# Patient Record
Sex: FEMALE | Race: WHITE | NOT HISPANIC OR LATINO
[De-identification: names, ages, dates, MRNs, and addresses within clinical notes are randomized per-mention and may not be internally consistent; named-entity substitution may affect disease eponyms.]

---

## 2018-10-26 ENCOUNTER — NON-APPOINTMENT (OUTPATIENT)
Age: 73
End: 2018-10-26

## 2018-10-26 ENCOUNTER — APPOINTMENT (OUTPATIENT)
Dept: CARDIOLOGY | Facility: CLINIC | Age: 73
End: 2018-10-26
Payer: MEDICARE

## 2018-10-26 VITALS
BODY MASS INDEX: 20.02 KG/M2 | HEART RATE: 76 BPM | HEIGHT: 63 IN | OXYGEN SATURATION: 98 % | WEIGHT: 113 LBS | SYSTOLIC BLOOD PRESSURE: 100 MMHG | DIASTOLIC BLOOD PRESSURE: 68 MMHG

## 2018-10-26 DIAGNOSIS — R91.1 SOLITARY PULMONARY NODULE: ICD-10-CM

## 2018-10-26 PROCEDURE — 93000 ELECTROCARDIOGRAM COMPLETE: CPT

## 2018-10-26 PROCEDURE — 99214 OFFICE O/P EST MOD 30 MIN: CPT

## 2018-12-03 ENCOUNTER — APPOINTMENT (OUTPATIENT)
Dept: CARDIOLOGY | Facility: CLINIC | Age: 73
End: 2018-12-03

## 2018-12-10 ENCOUNTER — APPOINTMENT (OUTPATIENT)
Dept: CARDIOLOGY | Facility: CLINIC | Age: 73
End: 2018-12-10

## 2019-10-21 ENCOUNTER — APPOINTMENT (OUTPATIENT)
Dept: CARDIOLOGY | Facility: CLINIC | Age: 74
End: 2019-10-21
Payer: MEDICARE

## 2019-10-21 PROCEDURE — 78452 HT MUSCLE IMAGE SPECT MULT: CPT

## 2019-10-21 PROCEDURE — A9502: CPT

## 2019-10-21 PROCEDURE — 93306 TTE W/DOPPLER COMPLETE: CPT

## 2019-10-21 PROCEDURE — 93015 CV STRESS TEST SUPVJ I&R: CPT

## 2019-11-01 ENCOUNTER — APPOINTMENT (OUTPATIENT)
Dept: CARDIOLOGY | Facility: CLINIC | Age: 74
End: 2019-11-01
Payer: MEDICARE

## 2019-11-01 VITALS
WEIGHT: 111 LBS | HEIGHT: 63 IN | SYSTOLIC BLOOD PRESSURE: 102 MMHG | DIASTOLIC BLOOD PRESSURE: 68 MMHG | BODY MASS INDEX: 19.67 KG/M2 | HEART RATE: 70 BPM | OXYGEN SATURATION: 96 %

## 2019-11-01 DIAGNOSIS — Z72.3 LACK OF PHYSICAL EXERCISE: ICD-10-CM

## 2019-11-01 DIAGNOSIS — H53.9 UNSPECIFIED VISUAL DISTURBANCE: ICD-10-CM

## 2019-11-01 DIAGNOSIS — Z78.9 OTHER SPECIFIED HEALTH STATUS: ICD-10-CM

## 2019-11-01 DIAGNOSIS — I31.3 PERICARDIAL EFFUSION (NONINFLAMMATORY): ICD-10-CM

## 2019-11-01 PROCEDURE — 99214 OFFICE O/P EST MOD 30 MIN: CPT

## 2019-11-01 NOTE — ASSESSMENT
[FreeTextEntry1] : Visual disturbances: The patient has had some episodes of wavy lines in her vision. 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 Bilateral carotid duplex has been arranged to rule out peripheral vascular disease.\par \par Shortness of breath: The patient is some minimal dyspnea at times. At other times she is able to exercise on treadmill for 15-30 minutes without exertional symptoms. Overall continued observation is her best option.\par \par Pericardial effusion: Essentially no change from prior.\par Observation.

## 2019-11-26 ENCOUNTER — APPOINTMENT (OUTPATIENT)
Dept: CARDIOLOGY | Facility: CLINIC | Age: 74
End: 2019-11-26

## 2019-11-26 DIAGNOSIS — Z86.39 PERSONAL HISTORY OF OTHER ENDOCRINE, NUTRITIONAL AND METABOLIC DISEASE: ICD-10-CM

## 2020-11-13 ENCOUNTER — APPOINTMENT (OUTPATIENT)
Dept: CARDIOLOGY | Facility: CLINIC | Age: 75
End: 2020-11-13

## 2021-02-22 ENCOUNTER — NON-APPOINTMENT (OUTPATIENT)
Age: 76
End: 2021-02-22

## 2021-02-22 PROBLEM — Z78.9 DOES NOT USE ILLICIT DRUGS: Status: ACTIVE | Noted: 2021-02-22

## 2021-02-22 PROBLEM — Z86.39 HISTORY OF HYPOTHYROIDISM: Status: RESOLVED | Noted: 2021-02-22 | Resolved: 2021-02-22

## 2021-02-22 PROBLEM — Z78.9 CAFFEINE USE: Status: ACTIVE | Noted: 2021-02-22

## 2021-02-22 PROBLEM — Z72.3 DOES NOT EXERCISE: Status: ACTIVE | Noted: 2021-02-22

## 2021-02-22 PROBLEM — Z78.9 NON-SMOKER: Status: ACTIVE | Noted: 2021-02-22

## 2021-02-22 RX ORDER — LEVOTHYROXINE SODIUM 75 UG/1
75 TABLET ORAL DAILY
Refills: 0 | Status: ACTIVE | COMMUNITY

## 2021-02-23 ENCOUNTER — NON-APPOINTMENT (OUTPATIENT)
Age: 76
End: 2021-02-23

## 2021-02-23 ENCOUNTER — APPOINTMENT (OUTPATIENT)
Dept: CARDIOLOGY | Facility: CLINIC | Age: 76
End: 2021-02-23
Payer: MEDICARE

## 2021-02-23 VITALS
WEIGHT: 110 LBS | TEMPERATURE: 98.9 F | OXYGEN SATURATION: 96 % | HEART RATE: 86 BPM | BODY MASS INDEX: 19.49 KG/M2 | SYSTOLIC BLOOD PRESSURE: 110 MMHG | HEIGHT: 63 IN | DIASTOLIC BLOOD PRESSURE: 78 MMHG

## 2021-02-23 DIAGNOSIS — R00.2 PALPITATIONS: ICD-10-CM

## 2021-02-23 DIAGNOSIS — R06.00 DYSPNEA, UNSPECIFIED: ICD-10-CM

## 2021-02-23 PROCEDURE — 93000 ELECTROCARDIOGRAM COMPLETE: CPT

## 2021-02-23 PROCEDURE — 99213 OFFICE O/P EST LOW 20 MIN: CPT

## 2021-02-23 PROCEDURE — 93242 EXT ECG>48HR<7D RECORDING: CPT

## 2021-02-23 RX ORDER — CHROMIUM 200 MCG
TABLET ORAL
Refills: 0 | Status: ACTIVE | COMMUNITY

## 2021-02-23 RX ORDER — MULTIVITAMIN
TABLET ORAL
Refills: 0 | Status: ACTIVE | COMMUNITY

## 2021-02-23 NOTE — HISTORY OF PRESENT ILLNESS
[FreeTextEntry1] : The patient has a chronic pericardial effusion.  The patient was noted by her son to be somewhat tachypneic after going up and down stairs.  The patient wears a K N95 mask.  She is wearing a mask at the time that the son felt she was tachypneic.  In addition the patient has been having palpitations nearly every day for several weeks.  They can last up to hours at a time.

## 2021-02-23 NOTE — ASSESSMENT
[FreeTextEntry1] : Shortness of breath: Transthoracic echocardiography is indicated to assess her pericardial effusion.\par \par Palpitations: 7-day event monitoring has been arranged.

## 2021-03-05 ENCOUNTER — APPOINTMENT (OUTPATIENT)
Dept: CARDIOLOGY | Facility: CLINIC | Age: 76
End: 2021-03-05
Payer: MEDICARE

## 2021-03-05 PROCEDURE — 93306 TTE W/DOPPLER COMPLETE: CPT

## 2021-03-11 ENCOUNTER — APPOINTMENT (OUTPATIENT)
Dept: CARDIOLOGY | Facility: CLINIC | Age: 76
End: 2021-03-11

## 2021-03-27 ENCOUNTER — TRANSCRIPTION ENCOUNTER (OUTPATIENT)
Age: 76
End: 2021-03-27

## 2022-10-20 ENCOUNTER — NON-APPOINTMENT (OUTPATIENT)
Age: 77
End: 2022-10-20

## 2022-11-14 ENCOUNTER — APPOINTMENT (OUTPATIENT)
Dept: CARDIOLOGY | Facility: CLINIC | Age: 77
End: 2022-11-14

## 2022-11-14 PROCEDURE — 93306 TTE W/DOPPLER COMPLETE: CPT

## 2022-11-16 ENCOUNTER — NON-APPOINTMENT (OUTPATIENT)
Age: 77
End: 2022-11-16

## 2022-11-22 ENCOUNTER — APPOINTMENT (OUTPATIENT)
Dept: CARDIOLOGY | Facility: CLINIC | Age: 77
End: 2022-11-22

## 2023-01-09 ENCOUNTER — OFFICE (OUTPATIENT)
Dept: URBAN - METROPOLITAN AREA CLINIC 100 | Facility: CLINIC | Age: 78
Setting detail: OPHTHALMOLOGY
End: 2023-01-09
Payer: MEDICARE

## 2023-01-09 DIAGNOSIS — H02.831: ICD-10-CM

## 2023-01-09 DIAGNOSIS — H25.11: ICD-10-CM

## 2023-01-09 DIAGNOSIS — H02.834: ICD-10-CM

## 2023-01-09 DIAGNOSIS — H40.1111: ICD-10-CM

## 2023-01-09 DIAGNOSIS — H02.403: ICD-10-CM

## 2023-01-09 DIAGNOSIS — Z96.1: ICD-10-CM

## 2023-01-09 DIAGNOSIS — H25.89: ICD-10-CM

## 2023-01-09 PROCEDURE — 92083 EXTENDED VISUAL FIELD XM: CPT | Performed by: OPHTHALMOLOGY

## 2023-01-09 PROCEDURE — 99214 OFFICE O/P EST MOD 30 MIN: CPT | Performed by: OPHTHALMOLOGY

## 2023-01-09 PROCEDURE — 92133 CPTRZD OPH DX IMG PST SGM ON: CPT | Performed by: OPHTHALMOLOGY

## 2023-01-09 ASSESSMENT — KERATOMETRY
METHOD_AUTO_MANUAL: AUTO
OS_K2POWER_DIOPTERS: 42.75
OD_K1POWER_DIOPTERS: 42.00
OS_AXISANGLE_DEGREES: 067
OS_K1POWER_DIOPTERS: 41.50
OD_AXISANGLE_DEGREES: 084
OD_K2POWER_DIOPTERS: 42.25

## 2023-01-09 ASSESSMENT — REFRACTION_MANIFEST
OD_CYLINDER: -1.00
OD_CYLINDER: -1.00
OS_SPHERE: PLANO
OD_AXIS: 080
OD_SPHERE: +1.25
OS_SPHERE: PLANO
OD_ADD: +2.75
OD_VA1: 20/25-2
OS_AXIS: 055
OS_AXIS: 055
OS_CYLINDER: -0.75
OS_VA1: 20/25-2
OD_SPHERE: +1.25
OD_AXIS: 080
OS_ADD: +2.75
OS_VA1: 20/25-2
OS_CYLINDER: -0.75
OD_VA1: 20/25-2

## 2023-01-09 ASSESSMENT — REFRACTION_CURRENTRX
OS_CYLINDER: -0.75
OD_SPHERE: +1.25
OD_AXIS: 81
OD_VPRISM_DIRECTION: SV
OS_AXIS: 54
OS_OVR_VA: 20/
OD_OVR_VA: 20/
OS_VPRISM_DIRECTION: SV
OS_SPHERE: PLANO
OD_CYLINDER: -1.00

## 2023-01-09 ASSESSMENT — VISUAL ACUITY
OD_BCVA: 20/50-2
OS_BCVA: 20/40-2

## 2023-01-09 ASSESSMENT — REFRACTION_AUTOREFRACTION
OD_AXIS: 078
OD_CYLINDER: -1.75
OS_CYLINDER: -1.50
OD_SPHERE: +2.50
OS_AXIS: 065
OS_SPHERE: +1.00

## 2023-01-09 ASSESSMENT — LID POSITION - DERMATOCHALASIS
OD_DERMATOCHALASIS: RUL 1+
OS_DERMATOCHALASIS: LUL 1+

## 2023-01-09 ASSESSMENT — SPHEQUIV_DERIVED
OD_SPHEQUIV: 0.75
OD_SPHEQUIV: 0.75
OS_SPHEQUIV: 0.25
OD_SPHEQUIV: 1.625

## 2023-01-09 ASSESSMENT — PACHYMETRY
OD_CT_CORRECTION: -1
OS_CT_CORRECTION: -1
OS_CT_UM: 558
OD_CT_UM: 566

## 2023-01-09 ASSESSMENT — AXIALLENGTH_DERIVED
OD_AL: 23.8067
OD_AL: 23.8067
OS_AL: 24.0067
OD_AL: 23.4645

## 2023-01-09 ASSESSMENT — LID POSITION - PTOSIS
OS_PTOSIS: LUL T
OD_PTOSIS: RUL T

## 2023-01-09 ASSESSMENT — TONOMETRY
OS_IOP_MMHG: 14
OD_IOP_MMHG: 14

## 2023-01-09 ASSESSMENT — CONFRONTATIONAL VISUAL FIELD TEST (CVF)
OD_FINDINGS: FULL
OS_FINDINGS: FULL

## 2023-05-08 ENCOUNTER — OFFICE (OUTPATIENT)
Dept: URBAN - METROPOLITAN AREA CLINIC 100 | Facility: CLINIC | Age: 78
Setting detail: OPHTHALMOLOGY
End: 2023-05-08
Payer: MEDICARE

## 2023-05-08 DIAGNOSIS — H02.831: ICD-10-CM

## 2023-05-08 DIAGNOSIS — H02.403: ICD-10-CM

## 2023-05-08 DIAGNOSIS — Z96.1: ICD-10-CM

## 2023-05-08 DIAGNOSIS — H25.11: ICD-10-CM

## 2023-05-08 DIAGNOSIS — H02.834: ICD-10-CM

## 2023-05-08 DIAGNOSIS — H25.89: ICD-10-CM

## 2023-05-08 DIAGNOSIS — H40.1111: ICD-10-CM

## 2023-05-08 PROCEDURE — 99214 OFFICE O/P EST MOD 30 MIN: CPT | Performed by: OPHTHALMOLOGY

## 2023-05-08 ASSESSMENT — PACHYMETRY
OS_CT_CORRECTION: -1
OD_CT_UM: 566
OD_CT_CORRECTION: -1
OS_CT_UM: 558

## 2023-05-08 ASSESSMENT — REFRACTION_CURRENTRX
OS_AXIS: 055
OD_OVR_VA: 20/
OS_OVR_VA: 20/
OS_SPHERE: +2.75
OS_VPRISM_DIRECTION: SV
OS_SPHERE: PLANO
OD_VPRISM_DIRECTION: SV
OD_SPHERE: +1.25
OD_AXIS: 080
OD_OVR_VA: 20/
OD_AXIS: 090
OD_CYLINDER: -1.00
OS_OVR_VA: 20/
OS_VPRISM_DIRECTION: SV
OS_CYLINDER: -0.75
OD_CYLINDER: -1.00
OD_SPHERE: +4.00
OS_CYLINDER: -0.75
OS_AXIS: 050
OD_VPRISM_DIRECTION: SV

## 2023-05-08 ASSESSMENT — LID POSITION - PTOSIS
OD_PTOSIS: RUL T
OS_PTOSIS: LUL T

## 2023-05-08 ASSESSMENT — REFRACTION_MANIFEST
OD_AXIS: 080
OD_CYLINDER: -1.00
OS_CYLINDER: -0.75
OS_VA1: 20/25-2
OS_SPHERE: PLANO
OD_VA1: 20/25-2
OD_AXIS: 080
OD_ADD: +2.75
OS_ADD: +2.75
OD_SPHERE: +1.25
OS_VA1: 20/25-2
OS_SPHERE: PLANO
OD_CYLINDER: -1.00
OD_SPHERE: +1.25
OS_CYLINDER: -0.75
OS_AXIS: 055
OD_VA1: 20/25-2
OS_AXIS: 055

## 2023-05-08 ASSESSMENT — VISUAL ACUITY
OS_BCVA: 20/30-1
OD_BCVA: 20/25

## 2023-05-08 ASSESSMENT — REFRACTION_AUTOREFRACTION
OS_SPHERE: PLANO
OD_SPHERE: +3.00
OD_CYLINDER: -2.00
OS_CYLINDER: -0.50
OS_AXIS: 074
OD_AXIS: 083

## 2023-05-08 ASSESSMENT — KERATOMETRY
OS_AXISANGLE_DEGREES: 075
OD_K2POWER_DIOPTERS: 42.25
OS_K2POWER_DIOPTERS: 42.75
OS_K1POWER_DIOPTERS: 42.00
OD_K1POWER_DIOPTERS: 41.75
METHOD_AUTO_MANUAL: AUTO
OD_AXISANGLE_DEGREES: 098

## 2023-05-08 ASSESSMENT — AXIALLENGTH_DERIVED
OD_AL: 23.8535
OD_AL: 23.8535
OD_AL: 23.3658

## 2023-05-08 ASSESSMENT — SPHEQUIV_DERIVED
OD_SPHEQUIV: 2
OD_SPHEQUIV: 0.75
OD_SPHEQUIV: 0.75

## 2023-05-08 ASSESSMENT — TONOMETRY
OD_IOP_MMHG: 16
OS_IOP_MMHG: 15

## 2023-05-08 ASSESSMENT — LID POSITION - DERMATOCHALASIS
OS_DERMATOCHALASIS: LUL 1+
OD_DERMATOCHALASIS: RUL 1+

## 2023-09-11 ENCOUNTER — OFFICE (OUTPATIENT)
Dept: URBAN - METROPOLITAN AREA CLINIC 100 | Facility: CLINIC | Age: 78
Setting detail: OPHTHALMOLOGY
End: 2023-09-11
Payer: MEDICARE

## 2023-09-11 DIAGNOSIS — H25.89: ICD-10-CM

## 2023-09-11 DIAGNOSIS — H02.834: ICD-10-CM

## 2023-09-11 DIAGNOSIS — H02.403: ICD-10-CM

## 2023-09-11 DIAGNOSIS — H40.1111: ICD-10-CM

## 2023-09-11 DIAGNOSIS — Z96.1: ICD-10-CM

## 2023-09-11 DIAGNOSIS — H25.11: ICD-10-CM

## 2023-09-11 DIAGNOSIS — H02.831: ICD-10-CM

## 2023-09-11 PROCEDURE — 92250 FUNDUS PHOTOGRAPHY W/I&R: CPT | Performed by: OPHTHALMOLOGY

## 2023-09-11 PROCEDURE — 92014 COMPRE OPH EXAM EST PT 1/>: CPT | Performed by: OPHTHALMOLOGY

## 2023-09-11 ASSESSMENT — REFRACTION_MANIFEST
OS_ADD: +2.75
OS_VA1: 20/25
OD_SPHERE: +1.25
OS_SPHERE: PLANO
OS_CYLINDER: -1.00
OS_VA1: 20/25
OS_AXIS: 070
OS_CYLINDER: -1.00
OD_CYLINDER: -1.50
OD_AXIS: 055
OD_ADD: +2.75
OD_VA1: 20/20-2
OD_AXIS: 055
OD_VA1: 20/20-2
OS_AXIS: 070
OS_SPHERE: PLANO
OD_SPHERE: +1.25
OD_CYLINDER: -1.50

## 2023-09-11 ASSESSMENT — TONOMETRY
OD_IOP_MMHG: 16
OS_IOP_MMHG: 17

## 2023-09-11 ASSESSMENT — KERATOMETRY
OS_K1POWER_DIOPTERS: 42.50
OD_K2POWER_DIOPTERS: 43.25
OD_K1POWER_DIOPTERS: 42.50
OD_AXISANGLE_DEGREES: 140
OS_AXISANGLE_DEGREES: 166
OS_K2POWER_DIOPTERS: 43.00
METHOD_AUTO_MANUAL: AUTO

## 2023-09-11 ASSESSMENT — AXIALLENGTH_DERIVED
OD_AL: 23.4329
OD_AL: 23.6267
OD_AL: 23.6267
OS_AL: 23.8706

## 2023-09-11 ASSESSMENT — REFRACTION_CURRENTRX
OD_VPRISM_DIRECTION: SV
OD_VPRISM_DIRECTION: SV
OD_CYLINDER: -1.00
OS_CYLINDER: -0.75
OS_OVR_VA: 20/
OS_AXIS: 055
OD_OVR_VA: 20/
OS_OVR_VA: 20/
OS_SPHERE: PLANO
OS_AXIS: 050
OD_SPHERE: +1.25
OD_AXIS: 080
OD_AXIS: 090
OD_CYLINDER: -1.00
OS_SPHERE: +2.75
OD_OVR_VA: 20/
OS_CYLINDER: -0.75
OS_VPRISM_DIRECTION: SV
OS_VPRISM_DIRECTION: SV
OD_SPHERE: +4.00

## 2023-09-11 ASSESSMENT — REFRACTION_AUTOREFRACTION
OD_SPHERE: +1.75
OD_CYLINDER: -1.50
OS_AXIS: 069
OS_SPHERE: +0.50
OD_AXIS: 056
OS_CYLINDER: -1.00

## 2023-09-11 ASSESSMENT — SPHEQUIV_DERIVED
OD_SPHEQUIV: 1
OS_SPHEQUIV: 0
OD_SPHEQUIV: 0.5
OD_SPHEQUIV: 0.5

## 2023-09-11 ASSESSMENT — LID POSITION - DERMATOCHALASIS
OD_DERMATOCHALASIS: RUL 1+
OS_DERMATOCHALASIS: LUL 1+

## 2023-09-11 ASSESSMENT — VISUAL ACUITY
OS_BCVA: 20/30-2
OD_BCVA: 20/40

## 2023-09-11 ASSESSMENT — PACHYMETRY
OS_CT_UM: 558
OD_CT_CORRECTION: -1
OD_CT_UM: 566
OS_CT_CORRECTION: -1

## 2023-09-11 ASSESSMENT — LID POSITION - PTOSIS
OD_PTOSIS: RUL T
OS_PTOSIS: LUL T

## 2023-09-11 ASSESSMENT — CONFRONTATIONAL VISUAL FIELD TEST (CVF)
OS_FINDINGS: FULL
OD_FINDINGS: FULL

## 2024-01-18 ENCOUNTER — OFFICE (OUTPATIENT)
Dept: URBAN - METROPOLITAN AREA CLINIC 100 | Facility: CLINIC | Age: 79
Setting detail: OPHTHALMOLOGY
End: 2024-01-18
Payer: MEDICARE

## 2024-01-18 DIAGNOSIS — Z96.1: ICD-10-CM

## 2024-01-18 DIAGNOSIS — H02.834: ICD-10-CM

## 2024-01-18 DIAGNOSIS — H25.89: ICD-10-CM

## 2024-01-18 DIAGNOSIS — H25.11: ICD-10-CM

## 2024-01-18 DIAGNOSIS — H40.1111: ICD-10-CM

## 2024-01-18 DIAGNOSIS — H02.831: ICD-10-CM

## 2024-01-18 DIAGNOSIS — H02.403: ICD-10-CM

## 2024-01-18 PROCEDURE — 99214 OFFICE O/P EST MOD 30 MIN: CPT | Performed by: OPHTHALMOLOGY

## 2024-01-18 PROCEDURE — 92133 CPTRZD OPH DX IMG PST SGM ON: CPT | Performed by: OPHTHALMOLOGY

## 2024-01-18 PROCEDURE — 92083 EXTENDED VISUAL FIELD XM: CPT | Performed by: OPHTHALMOLOGY

## 2024-01-18 ASSESSMENT — REFRACTION_AUTOREFRACTION
OD_AXIS: 077
OS_CYLINDER: -1.00
OD_SPHERE: +2.50
OS_AXIS: 062
OD_CYLINDER: -1.75
OS_SPHERE: +0.25

## 2024-01-18 ASSESSMENT — REFRACTION_CURRENTRX
OD_OVR_VA: 20/
OS_VPRISM_DIRECTION: SV
OD_VPRISM_DIRECTION: SV
OS_OVR_VA: 20/
OS_OVR_VA: 20/
OD_SPHERE: +1.25
OD_OVR_VA: 20/
OS_CYLINDER: -0.75
OD_AXIS: 090
OD_AXIS: 055
OS_VPRISM_DIRECTION: SV
OD_SPHERE: +4.00
OS_AXIS: 050
OS_SPHERE: +2.75
OS_SPHERE: PLANO
OS_AXIS: 064
OD_CYLINDER: -1.50
OD_CYLINDER: -1.00
OD_VPRISM_DIRECTION: SV
OS_CYLINDER: -1.00

## 2024-01-18 ASSESSMENT — REFRACTION_MANIFEST
OD_ADD: +2.75
OS_SPHERE: PLANO
OS_AXIS: 070
OS_ADD: +2.75
OS_VA1: 20/25
OD_AXIS: 055
OS_CYLINDER: -1.00
OS_CYLINDER: -1.00
OD_SPHERE: +1.25
OS_VA1: 20/25
OD_VA1: 20/20-2
OD_SPHERE: +1.25
OS_AXIS: 070
OD_CYLINDER: -1.50
OD_CYLINDER: -1.50
OD_AXIS: 055
OD_VA1: 20/20-2
OS_SPHERE: PLANO

## 2024-01-18 ASSESSMENT — SPHEQUIV_DERIVED
OD_SPHEQUIV: 0.5
OD_SPHEQUIV: 1.625
OD_SPHEQUIV: 0.5
OS_SPHEQUIV: -0.25

## 2024-01-18 ASSESSMENT — CONFRONTATIONAL VISUAL FIELD TEST (CVF)
OS_FINDINGS: FULL
OD_FINDINGS: FULL

## 2024-01-18 ASSESSMENT — LID POSITION - DERMATOCHALASIS
OD_DERMATOCHALASIS: RUL 1+
OS_DERMATOCHALASIS: LUL 1+

## 2024-01-18 ASSESSMENT — LID POSITION - PTOSIS
OS_PTOSIS: LUL T
OD_PTOSIS: RUL T

## 2024-06-03 ENCOUNTER — OFFICE (OUTPATIENT)
Dept: URBAN - METROPOLITAN AREA CLINIC 100 | Facility: CLINIC | Age: 79
Setting detail: OPHTHALMOLOGY
End: 2024-06-03
Payer: MEDICARE

## 2024-06-03 DIAGNOSIS — H02.834: ICD-10-CM

## 2024-06-03 DIAGNOSIS — H40.1111: ICD-10-CM

## 2024-06-03 DIAGNOSIS — H02.831: ICD-10-CM

## 2024-06-03 DIAGNOSIS — H25.11: ICD-10-CM

## 2024-06-03 DIAGNOSIS — Z96.1: ICD-10-CM

## 2024-06-03 DIAGNOSIS — H02.403: ICD-10-CM

## 2024-06-03 DIAGNOSIS — H25.89: ICD-10-CM

## 2024-06-03 PROCEDURE — 99214 OFFICE O/P EST MOD 30 MIN: CPT | Performed by: OPHTHALMOLOGY

## 2024-06-03 ASSESSMENT — LID POSITION - PTOSIS
OS_PTOSIS: LUL T
OD_PTOSIS: RUL T

## 2024-06-03 ASSESSMENT — CONFRONTATIONAL VISUAL FIELD TEST (CVF)
OS_FINDINGS: FULL
OD_FINDINGS: FULL

## 2024-06-03 ASSESSMENT — LID POSITION - DERMATOCHALASIS
OD_DERMATOCHALASIS: RUL 1+
OS_DERMATOCHALASIS: LUL 1+

## 2024-07-23 ENCOUNTER — LABORATORY RESULT (OUTPATIENT)
Age: 79
End: 2024-07-23

## 2024-07-24 ENCOUNTER — APPOINTMENT (OUTPATIENT)
Dept: CARDIOLOGY | Facility: CLINIC | Age: 79
End: 2024-07-24
Payer: MEDICARE

## 2024-07-24 ENCOUNTER — NON-APPOINTMENT (OUTPATIENT)
Age: 79
End: 2024-07-24

## 2024-07-24 VITALS
WEIGHT: 130 LBS | HEART RATE: 80 BPM | BODY MASS INDEX: 23.04 KG/M2 | DIASTOLIC BLOOD PRESSURE: 74 MMHG | SYSTOLIC BLOOD PRESSURE: 122 MMHG | HEIGHT: 63 IN | OXYGEN SATURATION: 98 %

## 2024-07-24 DIAGNOSIS — R06.00 DYSPNEA, UNSPECIFIED: ICD-10-CM

## 2024-07-24 DIAGNOSIS — K21.9 GASTRO-ESOPHAGEAL REFLUX DISEASE W/OUT ESOPHAGITIS: ICD-10-CM

## 2024-07-24 PROCEDURE — 93306 TTE W/DOPPLER COMPLETE: CPT

## 2024-07-24 PROCEDURE — 99204 OFFICE O/P NEW MOD 45 MIN: CPT

## 2024-07-24 RX ORDER — PANTOPRAZOLE 20 MG/1
20 TABLET, DELAYED RELEASE ORAL
Qty: 90 | Refills: 1 | Status: ACTIVE | COMMUNITY
Start: 2024-07-24 | End: 1900-01-01

## 2024-07-24 NOTE — ASSESSMENT
[FreeTextEntry1] : Shortness of breath: Protonix has been prescribed to the patient's pharmacy.  Hopefully this is just GERD.  Exercise stress testing is indicated to assess for ischemia.  ECG is being done at this time.

## 2024-07-24 NOTE — REASON FOR VISIT
[FreeTextEntry1] : The patient is complain of shortness of breath.  For several months the patient has been having exertional dyspnea.  In addition at night she feels out of breath and an uncomfortable feeling in her chest.  She believes it may be a heaviness.  It lasts up to about 30 minutes at a time.  There is no exertional component to it.  There has been no change recently.  The patient believes that after food intake she occasionally gets an uncomfortable burning-like sensation in the upper portion of her chest which is from GERD.  The patient underwent phlebotomy which showed brain natruretic peptide was 135 on July 24, 2024.  Hematocrit was 41.2 on July 24, 2024.  Transthoracic echocardiography today revealed no evidence of significant effusion.

## 2024-09-23 ENCOUNTER — APPOINTMENT (OUTPATIENT)
Dept: CARDIOLOGY | Facility: CLINIC | Age: 79
End: 2024-09-23
Payer: MEDICARE

## 2024-09-23 VITALS
SYSTOLIC BLOOD PRESSURE: 102 MMHG | BODY MASS INDEX: 22.15 KG/M2 | WEIGHT: 125 LBS | HEIGHT: 63 IN | HEART RATE: 85 BPM | OXYGEN SATURATION: 99 % | DIASTOLIC BLOOD PRESSURE: 70 MMHG

## 2024-09-23 DIAGNOSIS — R06.00 DYSPNEA, UNSPECIFIED: ICD-10-CM

## 2024-09-23 DIAGNOSIS — R00.2 PALPITATIONS: ICD-10-CM

## 2024-09-23 PROCEDURE — 93015 CV STRESS TEST SUPVJ I&R: CPT

## 2024-09-23 PROCEDURE — 99214 OFFICE O/P EST MOD 30 MIN: CPT

## 2024-09-23 NOTE — REASON FOR VISIT
[FreeTextEntry1] : The patient seen for follow-up of shortness of breath.  Unfortunately patient continues to be with shortness of breath.  Is limiting her quality of life.  The patient felt out of breath while [place].  There has been no recurrence of the previously described chest discomfort.  At night sometimes when she is lying down she feels short of breath.  She has some feeling that she cannot describe at the same time.

## 2024-09-23 NOTE — ASSESSMENT
[FreeTextEntry1] : Shortness of breath: I am concerned I am concerned that her shortness of breath is an anginal equivalent.  CT coronary angiography has been arranged to rule out coronary artery disease as an etiology to her syndrome consistent with angina pectoris.  Pericardial effusion: Smaller this year.  Do not feel that this is causative in his and her dyspnea syndrome.  Stress testing today was negative for ischemia.    Transthoracic echocardiography in July 2024 revealed normal left ventricular function and trace pericardial effusion.

## 2024-10-07 ENCOUNTER — OFFICE (OUTPATIENT)
Dept: URBAN - METROPOLITAN AREA CLINIC 100 | Facility: CLINIC | Age: 79
Setting detail: OPHTHALMOLOGY
End: 2024-10-07
Payer: MEDICARE

## 2024-10-07 DIAGNOSIS — H02.831: ICD-10-CM

## 2024-10-07 DIAGNOSIS — Z96.1: ICD-10-CM

## 2024-10-07 DIAGNOSIS — H35.341: ICD-10-CM

## 2024-10-07 DIAGNOSIS — H02.834: ICD-10-CM

## 2024-10-07 DIAGNOSIS — H25.11: ICD-10-CM

## 2024-10-07 DIAGNOSIS — H02.403: ICD-10-CM

## 2024-10-07 DIAGNOSIS — H40.1111: ICD-10-CM

## 2024-10-07 DIAGNOSIS — H25.89: ICD-10-CM

## 2024-10-07 PROCEDURE — 92014 COMPRE OPH EXAM EST PT 1/>: CPT | Performed by: OPHTHALMOLOGY

## 2024-10-07 PROCEDURE — 92250 FUNDUS PHOTOGRAPHY W/I&R: CPT | Performed by: OPHTHALMOLOGY

## 2024-10-07 ASSESSMENT — KERATOMETRY
OS_K1POWER_DIOPTERS: 42.75
OD_K1POWER_DIOPTERS: 41.75
OS_AXISANGLE_DEGREES: 090
OD_AXISANGLE_DEGREES: 171
METHOD_AUTO_MANUAL: AUTO
OD_K2POWER_DIOPTERS: 42.50
OS_K2POWER_DIOPTERS: 42.75

## 2024-10-07 ASSESSMENT — LID POSITION - PTOSIS
OS_PTOSIS: LUL T
OD_PTOSIS: RUL T

## 2024-10-07 ASSESSMENT — REFRACTION_MANIFEST
OD_CYLINDER: -1.50
OD_ADD: +2.75
OS_SPHERE: PLANO
OD_CYLINDER: -2.25
OS_AXIS: 070
OD_AXIS: 080
OS_CYLINDER: -1.00
OS_CYLINDER: -1.00
OD_SPHERE: +2.25
OD_AXIS: 055
OD_VA1: 20/20-2
OS_VA1: 20/25
OS_ADD: +2.75
OD_CYLINDER: -1.50
OS_VA1: 20/25
OD_SPHERE: +1.25
OD_VA1: 20/20-2
OS_VA1: 20/25
OS_CYLINDER: -1.50
OD_VA1: 20/20-2
OS_AXIS: 070
OS_SPHERE: +0.75
OD_SPHERE: +1.25
OD_AXIS: 055
OS_SPHERE: PLANO
OS_AXIS: 070

## 2024-10-07 ASSESSMENT — VISUAL ACUITY
OS_BCVA: 20/30
OD_BCVA: 20/25-2

## 2024-10-07 ASSESSMENT — REFRACTION_CURRENTRX
OD_OVR_VA: 20/
OD_SPHERE: +4.00
OS_CYLINDER: -1.00
OD_CYLINDER: -1.00
OS_AXIS: 050
OS_VPRISM_DIRECTION: SV
OS_SPHERE: +2.75
OD_AXIS: 062
OD_CYLINDER: -1.50
OD_AXIS: 090
OS_OVR_VA: 20/
OS_OVR_VA: 20/
OS_CYLINDER: -0.75
OD_SPHERE: +1.25
OS_VPRISM_DIRECTION: SV
OS_AXIS: 066
OD_VPRISM_DIRECTION: SV
OD_OVR_VA: 20/
OS_SPHERE: PLANO
OD_VPRISM_DIRECTION: SV

## 2024-10-07 ASSESSMENT — REFRACTION_AUTOREFRACTION
OD_AXIS: 077
OD_SPHERE: +2.75
OS_SPHERE: +0.75
OD_CYLINDER: -2.25
OS_AXIS: 071
OS_CYLINDER: -1.50

## 2024-10-07 ASSESSMENT — TONOMETRY
OD_IOP_MMHG: 20
OS_IOP_MMHG: 18

## 2024-10-07 ASSESSMENT — PACHYMETRY
OD_CT_UM: 566
OD_CT_CORRECTION: -1
OS_CT_UM: 558
OS_CT_CORRECTION: -1

## 2024-10-07 ASSESSMENT — LID POSITION - DERMATOCHALASIS
OS_DERMATOCHALASIS: LUL 1+
OD_DERMATOCHALASIS: RUL 1+

## 2024-10-07 ASSESSMENT — CONFRONTATIONAL VISUAL FIELD TEST (CVF)
OS_FINDINGS: FULL
OD_FINDINGS: FULL

## 2024-10-21 ENCOUNTER — NON-APPOINTMENT (OUTPATIENT)
Age: 79
End: 2024-10-21

## 2024-10-31 ENCOUNTER — APPOINTMENT (OUTPATIENT)
Dept: CT IMAGING | Facility: CLINIC | Age: 79
End: 2024-10-31

## 2024-10-31 ENCOUNTER — OUTPATIENT (OUTPATIENT)
Dept: OUTPATIENT SERVICES | Facility: HOSPITAL | Age: 79
LOS: 1 days | End: 2024-10-31
Payer: SELF-PAY

## 2024-10-31 DIAGNOSIS — R06.00 DYSPNEA, UNSPECIFIED: ICD-10-CM

## 2024-10-31 PROCEDURE — 75571 CT HRT W/O DYE W/CA TEST: CPT | Mod: 26

## 2024-10-31 PROCEDURE — 75571 CT HRT W/O DYE W/CA TEST: CPT

## 2024-11-05 ENCOUNTER — NON-APPOINTMENT (OUTPATIENT)
Age: 79
End: 2024-11-05

## 2024-11-14 ENCOUNTER — APPOINTMENT (OUTPATIENT)
Dept: OBGYN | Facility: CLINIC | Age: 79
End: 2024-11-14
Payer: COMMERCIAL

## 2024-11-14 ENCOUNTER — NON-APPOINTMENT (OUTPATIENT)
Age: 79
End: 2024-11-14

## 2024-11-14 VITALS — DIASTOLIC BLOOD PRESSURE: 70 MMHG | SYSTOLIC BLOOD PRESSURE: 110 MMHG | HEIGHT: 63 IN

## 2024-11-14 DIAGNOSIS — Z82.3 FAMILY HISTORY OF STROKE: ICD-10-CM

## 2024-11-14 DIAGNOSIS — Z86.39 PERSONAL HISTORY OF OTHER ENDOCRINE, NUTRITIONAL AND METABOLIC DISEASE: ICD-10-CM

## 2024-11-14 DIAGNOSIS — H91.90 UNSPECIFIED HEARING LOSS, UNSPECIFIED EAR: ICD-10-CM

## 2024-11-14 DIAGNOSIS — Z01.419 ENCOUNTER FOR GYNECOLOGICAL EXAMINATION (GENERAL) (ROUTINE) W/OUT ABNORMAL FINDINGS: ICD-10-CM

## 2024-11-14 LAB
CARD LOT #: NORMAL
CARD LOT EXP DATE: NORMAL
DATE COLLECTED: NORMAL
DATE COLLECTED: NORMAL
DEVELOPER LOT #: NORMAL
DEVELOPER LOT EXP DATE: NORMAL
HEMOCCULT 2: NEGATIVE
HEMOCCULT SP1 STL QL: NEGATIVE
QUALITY CONTROL: YES
QUALITY CONTROL: YES

## 2024-11-14 PROCEDURE — G0101: CPT

## 2024-11-14 PROCEDURE — 82270 OCCULT BLOOD FECES: CPT

## 2025-02-03 ENCOUNTER — OFFICE (OUTPATIENT)
Dept: URBAN - METROPOLITAN AREA CLINIC 100 | Facility: CLINIC | Age: 80
Setting detail: OPHTHALMOLOGY
End: 2025-02-03
Payer: MEDICARE

## 2025-02-03 DIAGNOSIS — H02.831: ICD-10-CM

## 2025-02-03 DIAGNOSIS — H40.1111: ICD-10-CM

## 2025-02-03 DIAGNOSIS — H25.11: ICD-10-CM

## 2025-02-03 DIAGNOSIS — H02.834: ICD-10-CM

## 2025-02-03 DIAGNOSIS — Z96.1: ICD-10-CM

## 2025-02-03 DIAGNOSIS — H02.403: ICD-10-CM

## 2025-02-03 DIAGNOSIS — H25.89: ICD-10-CM

## 2025-02-03 DIAGNOSIS — H35.341: ICD-10-CM

## 2025-02-03 PROCEDURE — 92133 CPTRZD OPH DX IMG PST SGM ON: CPT | Performed by: OPHTHALMOLOGY

## 2025-02-03 PROCEDURE — 92014 COMPRE OPH EXAM EST PT 1/>: CPT | Performed by: OPHTHALMOLOGY

## 2025-02-03 PROCEDURE — 92083 EXTENDED VISUAL FIELD XM: CPT | Performed by: OPHTHALMOLOGY

## 2025-02-03 ASSESSMENT — REFRACTION_CURRENTRX
OS_VPRISM_DIRECTION: SV
OD_AXIS: 083
OS_AXIS: 066
OS_SPHERE: +2.75
OD_SPHERE: PLANO
OS_AXIS: 058
OS_CYLINDER: -1.50
OD_AXIS: 090
OS_AXIS: 050
OD_CYLINDER: -1.00
OS_OVR_VA: 20/
OD_AXIS: 062
OS_AXIS: 052
OD_VPRISM_DIRECTION: SV
OD_AXIS: 069
OS_SPHERE: +2.75
OS_SPHERE: PLANO
OS_VPRISM_DIRECTION: SV
OD_SPHERE: +1.25
OD_SPHERE: +4.25
OD_CYLINDER: -1.00
OS_CYLINDER: -0.75
OD_OVR_VA: 20/
OS_CYLINDER: -0.75
OD_VPRISM_DIRECTION: SV
OD_SPHERE: +4.00
OS_VPRISM_DIRECTION: SV
OD_VPRISM_DIRECTION: SV
OD_OVR_VA: 20/
OS_CYLINDER: -1.00
OD_VPRISM_DIRECTION: SV
OD_CYLINDER: -1.00
OS_OVR_VA: 20/
OD_CYLINDER: -1.50
OS_VPRISM_DIRECTION: SV
OS_SPHERE: +1.25

## 2025-02-03 ASSESSMENT — REFRACTION_MANIFEST
OD_VA1: 20/20-2
OS_ADD: +2.75
OS_AXIS: 070
OD_AXIS: 055
OS_AXIS: 065
OD_SPHERE: +1.25
OS_AXIS: 070
OD_VA1: 20/20-2
OD_CYLINDER: -1.50
OS_VA1: 20/25
OD_AXIS: 070
OD_CYLINDER: -1.50
OD_SPHERE: +2.25
OS_SPHERE: PLANO
OD_SPHERE: +1.25
OD_CYLINDER: -2.25
OS_CYLINDER: -1.50
OS_AXIS: 070
OD_AXIS: 055
OS_SPHERE: +0.50
OD_VA1: 20/20-2
OS_SPHERE: +0.75
OD_CYLINDER: -2.00
OS_CYLINDER: -1.00
OD_SPHERE: +2.25
OS_VA1: 20/25-2
OS_VA1: 20/25
OD_ADD: +2.75
OD_VA1: 20/20-2
OS_SPHERE: PLANO
OS_CYLINDER: -1.00
OS_VA1: 20/25
OD_AXIS: 080
OS_CYLINDER: -1.50

## 2025-02-03 ASSESSMENT — KERATOMETRY
OS_AXISANGLE_DEGREES: 165
OD_K2POWER_DIOPTERS: 42.50
OD_AXISANGLE_DEGREES: 140
OD_K1POWER_DIOPTERS: 42.25
OS_K1POWER_DIOPTERS: 37.50
OS_K2POWER_DIOPTERS: 42.50
METHOD_AUTO_MANUAL: AUTO

## 2025-02-03 ASSESSMENT — REFRACTION_AUTOREFRACTION
OD_SPHERE: +2.25
OD_CYLINDER: -2.00
OS_CYLINDER: -1.50
OS_AXIS: 065
OD_AXIS: 071
OS_SPHERE: +0.50

## 2025-02-03 ASSESSMENT — PACHYMETRY
OD_CT_CORRECTION: -1
OS_CT_UM: 558
OD_CT_UM: 566
OS_CT_CORRECTION: -1

## 2025-02-03 ASSESSMENT — LID POSITION - PTOSIS
OS_PTOSIS: LUL T
OD_PTOSIS: RUL T

## 2025-02-03 ASSESSMENT — VISUAL ACUITY
OS_BCVA: 20/30-2
OD_BCVA: 20/30-1

## 2025-02-03 ASSESSMENT — TONOMETRY
OS_IOP_MMHG: 15
OD_IOP_MMHG: 17

## 2025-02-03 ASSESSMENT — CONFRONTATIONAL VISUAL FIELD TEST (CVF)
OD_FINDINGS: FULL
OS_FINDINGS: FULL

## 2025-02-03 ASSESSMENT — LID POSITION - DERMATOCHALASIS
OS_DERMATOCHALASIS: LUL 1+
OD_DERMATOCHALASIS: RUL 1+

## 2025-06-02 ENCOUNTER — OFFICE (OUTPATIENT)
Dept: URBAN - METROPOLITAN AREA CLINIC 100 | Facility: CLINIC | Age: 80
Setting detail: OPHTHALMOLOGY
End: 2025-06-02
Payer: MEDICARE

## 2025-06-02 DIAGNOSIS — H02.834: ICD-10-CM

## 2025-06-02 DIAGNOSIS — H25.11: ICD-10-CM

## 2025-06-02 DIAGNOSIS — Z96.1: ICD-10-CM

## 2025-06-02 DIAGNOSIS — H02.831: ICD-10-CM

## 2025-06-02 DIAGNOSIS — H40.1111: ICD-10-CM

## 2025-06-02 DIAGNOSIS — H25.89: ICD-10-CM

## 2025-06-02 DIAGNOSIS — H02.403: ICD-10-CM

## 2025-06-02 PROCEDURE — 99214 OFFICE O/P EST MOD 30 MIN: CPT | Performed by: OPHTHALMOLOGY

## 2025-06-02 ASSESSMENT — CONFRONTATIONAL VISUAL FIELD TEST (CVF)
OD_FINDINGS: FULL
OS_FINDINGS: FULL

## 2025-06-02 ASSESSMENT — REFRACTION_MANIFEST
OD_CYLINDER: -1.50
OD_VA1: 20/20-2
OS_VA1: 20/25-2
OS_SPHERE: PLANO
OS_SPHERE: PLANO
OD_VA1: 20/20-2
OS_AXIS: 070
OD_SPHERE: +2.25
OD_VA1: 20/20-2
OD_AXIS: 080
OD_SPHERE: +1.25
OS_VA1: 20/25
OD_SPHERE: +2.25
OD_CYLINDER: -2.00
OD_AXIS: 055
OD_VA1: 20/20-2
OD_AXIS: 055
OD_CYLINDER: -1.50
OD_AXIS: 070
OS_CYLINDER: -1.00
OS_AXIS: 065
OS_VA1: 20/25
OD_SPHERE: +1.25
OS_CYLINDER: -1.50
OD_CYLINDER: -2.25
OS_AXIS: 070
OS_SPHERE: +0.50
OS_SPHERE: +0.75
OS_ADD: +2.75
OS_CYLINDER: -1.00
OD_ADD: +2.75
OS_AXIS: 070
OS_VA1: 20/25
OS_CYLINDER: -1.50

## 2025-06-02 ASSESSMENT — REFRACTION_CURRENTRX
OS_CYLINDER: -1.00
OD_AXIS: 083
OS_VPRISM_DIRECTION: SV
OS_AXIS: 058
OD_SPHERE: +1.25
OD_CYLINDER: -1.00
OD_OVR_VA: 20/
OD_VPRISM_DIRECTION: SV
OD_VPRISM_DIRECTION: SV
OD_CYLINDER: -1.50
OS_AXIS: 052
OD_SPHERE: +4.25
OD_VPRISM_DIRECTION: SV
OS_SPHERE: PLANO
OS_OVR_VA: 20/
OD_AXIS: 069
OD_SPHERE: PLANO
OD_CYLINDER: -1.00
OS_SPHERE: +2.75
OS_CYLINDER: -0.75
OS_CYLINDER: -0.75
OS_CYLINDER: -1.50
OD_CYLINDER: -1.00
OS_VPRISM_DIRECTION: SV
OS_OVR_VA: 20/
OS_AXIS: 050
OS_AXIS: 066
OD_VPRISM_DIRECTION: SV
OS_VPRISM_DIRECTION: SV
OS_VPRISM_DIRECTION: SV
OS_SPHERE: +2.75
OD_SPHERE: +4.00
OD_AXIS: 062
OD_OVR_VA: 20/
OD_AXIS: 090
OS_SPHERE: +1.25

## 2025-06-02 ASSESSMENT — KERATOMETRY
OS_K2POWER_DIOPTERS: 42.75
OS_K1POWER_DIOPTERS: 42.25
METHOD_AUTO_MANUAL: AUTO
OS_AXISANGLE_DEGREES: 096
OD_K2POWER_DIOPTERS: 42.75
OD_K1POWER_DIOPTERS: 42.00
OD_AXISANGLE_DEGREES: 129

## 2025-06-02 ASSESSMENT — LID POSITION - DERMATOCHALASIS
OS_DERMATOCHALASIS: LUL 1+
OD_DERMATOCHALASIS: RUL 1+

## 2025-06-02 ASSESSMENT — REFRACTION_AUTOREFRACTION
OD_SPHERE: +2.25
OS_SPHERE: PLANO
OD_CYLINDER: -1.75
OD_AXIS: 074
OS_CYLINDER: -1.00
OS_AXIS: 057

## 2025-06-02 ASSESSMENT — PACHYMETRY
OS_CT_UM: 558
OD_CT_UM: 566
OD_CT_CORRECTION: -1
OS_CT_CORRECTION: -1

## 2025-06-02 ASSESSMENT — TONOMETRY
OS_IOP_MMHG: 14
OD_IOP_MMHG: 16

## 2025-06-02 ASSESSMENT — VISUAL ACUITY
OS_BCVA: 20/25+
OD_BCVA: 20/25+2

## 2025-06-02 ASSESSMENT — LID POSITION - PTOSIS
OS_PTOSIS: LUL T
OD_PTOSIS: RUL T